# Patient Record
Sex: MALE | Race: WHITE | NOT HISPANIC OR LATINO | URBAN - METROPOLITAN AREA
[De-identification: names, ages, dates, MRNs, and addresses within clinical notes are randomized per-mention and may not be internally consistent; named-entity substitution may affect disease eponyms.]

---

## 2020-10-12 ENCOUNTER — OFFICE VISIT (OUTPATIENT)
Dept: URBAN - METROPOLITAN AREA CLINIC 84 | Facility: CLINIC | Age: 35
End: 2020-10-12
Payer: COMMERCIAL

## 2020-10-12 ENCOUNTER — DASHBOARD ENCOUNTERS (OUTPATIENT)
Age: 35
End: 2020-10-12

## 2020-10-12 ENCOUNTER — WEB ENCOUNTER (OUTPATIENT)
Dept: URBAN - METROPOLITAN AREA CLINIC 84 | Facility: CLINIC | Age: 35
End: 2020-10-12

## 2020-10-12 DIAGNOSIS — K90.49 FOOD INTOLERANCE IN ADULT: ICD-10-CM

## 2020-10-12 DIAGNOSIS — K63.5 COLON POLYPS: ICD-10-CM

## 2020-10-12 DIAGNOSIS — K92.1 RECTAL BLEEDING: ICD-10-CM

## 2020-10-12 DIAGNOSIS — Z80.0 FAMILY HISTORY OF COLON CANCER: ICD-10-CM

## 2020-10-12 PROBLEM — 235719002 INTOLERANCE TO FOOD (FINDING): Status: ACTIVE | Noted: 2020-10-12

## 2020-10-12 PROCEDURE — G8427 DOCREV CUR MEDS BY ELIG CLIN: HCPCS | Performed by: INTERNAL MEDICINE

## 2020-10-12 PROCEDURE — G8417 CALC BMI ABV UP PARAM F/U: HCPCS | Performed by: INTERNAL MEDICINE

## 2020-10-12 PROCEDURE — 99204 OFFICE O/P NEW MOD 45 MIN: CPT | Performed by: INTERNAL MEDICINE

## 2020-10-12 PROCEDURE — 1036F TOBACCO NON-USER: CPT | Performed by: INTERNAL MEDICINE

## 2020-10-12 RX ORDER — POLYETHYLENE GLYCOL 3350 17 G/17G
AS DIRECTED POWDER, FOR SOLUTION ORAL ONCE A DAY
Qty: 238 GRAM | Refills: 0 | OUTPATIENT
Start: 2020-10-12

## 2020-10-12 RX ORDER — DICYCLOMINE HYDROCHLORIDE 20 MG/1
TAKE 1 TABLET (20 MG) BY ORAL ROUTE 3 TIMES PER DAY FOR 30 DAYS TABLET ORAL
Qty: 90 TABLET | Refills: 5 | OUTPATIENT
Start: 2020-10-12

## 2020-10-12 NOTE — HPI-TODAY'S VISIT:
Patient present for evaluation of hx sis colon polys removed 5 years ago. Patient with significant fmhx of CRC in 1st and 2nd degree relatives. He does have intermittent painless rectal bleeding with wiping. Patient has daily soft BM. Patient admits to soft pasty stools.

## 2020-10-30 ENCOUNTER — OFFICE VISIT (OUTPATIENT)
Dept: URBAN - METROPOLITAN AREA SURGERY CENTER 20 | Facility: SURGERY CENTER | Age: 35
End: 2020-10-30
Payer: COMMERCIAL

## 2020-10-30 DIAGNOSIS — K92.1 BLACK STOOL: ICD-10-CM

## 2020-10-30 PROCEDURE — G9937 DIG OR SURV COLSCO: HCPCS | Performed by: INTERNAL MEDICINE

## 2020-10-30 PROCEDURE — 45378 DIAGNOSTIC COLONOSCOPY: CPT | Performed by: INTERNAL MEDICINE

## 2020-10-30 PROCEDURE — G8907 PT DOC NO EVENTS ON DISCHARG: HCPCS | Performed by: INTERNAL MEDICINE

## 2021-07-27 ENCOUNTER — HOSPITAL ENCOUNTER (EMERGENCY)
Dept: HOSPITAL 5 - ED | Age: 36
LOS: 1 days | Discharge: PSYCHIATRIC OR SUBSTANCE USE FACILITY | End: 2021-07-28
Payer: COMMERCIAL

## 2021-07-27 DIAGNOSIS — F32.9: Primary | ICD-10-CM

## 2021-07-27 DIAGNOSIS — R45.851: ICD-10-CM

## 2021-07-27 DIAGNOSIS — Z20.822: ICD-10-CM

## 2021-07-27 LAB
BASOPHILS # (AUTO): 0.1 K/MM3 (ref 0–0.1)
BASOPHILS NFR BLD AUTO: 0.7 % (ref 0–1.8)
BENZODIAZEPINES SCREEN,URINE: (no result)
BILIRUB UR QL STRIP: (no result)
BLOOD UR QL VISUAL: (no result)
BUN SERPL-MCNC: 8 MG/DL (ref 9–20)
BUN/CREAT SERPL: 9 %
CALCIUM SERPL-MCNC: 9.5 MG/DL (ref 8.4–10.2)
EOSINOPHIL # BLD AUTO: 0 K/MM3 (ref 0–0.4)
EOSINOPHIL NFR BLD AUTO: 0.6 % (ref 0–4.3)
HCT VFR BLD CALC: 44.9 % (ref 35.5–45.6)
HEMOLYSIS INDEX: 8
HGB BLD-MCNC: 15.5 GM/DL (ref 11.8–15.2)
LYMPHOCYTES # BLD AUTO: 2.1 K/MM3 (ref 1.2–5.4)
LYMPHOCYTES NFR BLD AUTO: 24.9 % (ref 13.4–35)
MCHC RBC AUTO-ENTMCNC: 35 % (ref 32–34)
MCV RBC AUTO: 90 FL (ref 84–94)
METHADONE SCREEN,URINE: (no result)
MONOCYTES # (AUTO): 0.4 K/MM3 (ref 0–0.8)
MONOCYTES % (AUTO): 5 % (ref 0–7.3)
MUCOUS THREADS #/AREA URNS HPF: (no result) /HPF
OPIATE SCREEN,URINE: (no result)
PH UR STRIP: 6 [PH] (ref 5–7)
PLATELET # BLD: 304 K/MM3 (ref 140–440)
RBC # BLD AUTO: 5.02 M/MM3 (ref 3.65–5.03)
RBC #/AREA URNS HPF: 2 /HPF (ref 0–6)
UROBILINOGEN UR-MCNC: 2 MG/DL (ref ?–2)
WBC #/AREA URNS HPF: 5 /HPF (ref 0–6)

## 2021-07-27 PROCEDURE — G0480 DRUG TEST DEF 1-7 CLASSES: HCPCS

## 2021-07-27 PROCEDURE — 80185 ASSAY OF PHENYTOIN TOTAL: CPT

## 2021-07-27 PROCEDURE — 99285 EMERGENCY DEPT VISIT HI MDM: CPT

## 2021-07-27 PROCEDURE — 36415 COLL VENOUS BLD VENIPUNCTURE: CPT

## 2021-07-27 PROCEDURE — 81001 URINALYSIS AUTO W/SCOPE: CPT

## 2021-07-27 PROCEDURE — 80048 BASIC METABOLIC PNL TOTAL CA: CPT

## 2021-07-27 PROCEDURE — 80307 DRUG TEST PRSMV CHEM ANLYZR: CPT

## 2021-07-27 PROCEDURE — 80320 DRUG SCREEN QUANTALCOHOLS: CPT

## 2021-07-27 PROCEDURE — U0003 INFECTIOUS AGENT DETECTION BY NUCLEIC ACID (DNA OR RNA); SEVERE ACUTE RESPIRATORY SYNDROME CORONAVIRUS 2 (SARS-COV-2) (CORONAVIRUS DISEASE [COVID-19]), AMPLIFIED PROBE TECHNIQUE, MAKING USE OF HIGH THROUGHPUT TECHNOLOGIES AS DESCRIBED BY CMS-2020-01-R: HCPCS

## 2021-07-27 PROCEDURE — 85025 COMPLETE CBC W/AUTO DIFF WBC: CPT

## 2021-07-27 PROCEDURE — 80164 ASSAY DIPROPYLACETIC ACD TOT: CPT

## 2021-07-27 NOTE — EMERGENCY DEPARTMENT REPORT
ED Psych HPI





- General


Chief Complaint: Sickle Cell Crisis


Stated Complaint: SI


Time Seen by Provider: 07/27/21 20:47


Source: patient


Mode of arrival: Ambulatory





- History of Present Illness


Initial Comments: 





Patient is 36 years old male with history of depression.  Patient presented to 

the ER today by himself stating that he is very depressed and he has been having

suicidal thoughts for the last few days.  Patient stated that his plan is to 

shoot himself.  Patient admitted that he does have a gun in his car.  Patient 

stated that he wrote a suicidal letter yesterday.  Patient denied any visual or 

auditory hallucination.  No homicidal ideation.  Patient immediately put on 

1013.  Charge nurse called patient wife about the gun.


MD Complaint: suicidal ideation, feels depressed


-: days(s)


Associated Psychiatric Symptoms: depression, suicidal ideation


History of same: Yes


Quality: constant


Associated Symptoms: denies other symptoms


If Self Harm: admits thoughts of, has plan, gunshot





- Related Data


                                    Allergies











Allergy/AdvReac Type Severity Reaction Status Date / Time


 


No Known Allergies Allergy   Unverified 07/27/21 20:13














ED Review of Systems


ROS: 


Stated complaint: SI


Other details as noted in HPI





Comment: All other systems reviewed and negative


Constitutional: denies: chills, fever


Respiratory: denies: cough, shortness of breath, SOB with exertion


Cardiovascular: denies: chest pain, palpitations


Gastrointestinal: denies: abdominal pain, nausea, vomiting


Musculoskeletal: denies: back pain


Neurological: denies: headache, weakness, numbness, paresthesias, confusion


Psychiatric: depression, suicidal thoughts.  denies: auditory hallucinations, 

visual hallucinations, homicidal thoughts





ED Physical Exam





- General


Limitations: No Limitations


General appearance: alert, in no apparent distress, other (depressed )





- Head


Head exam: Present: atraumatic, normocephalic, normal inspection





- Eye


Eye exam: Present: normal appearance, PERRL





- ENT


ENT exam: Present: normal exam, normal orophraynx, mucous membranes moist





- Neck


Neck exam: Present: normal inspection, full ROM.  Absent: tenderness, 

meningismus





- Respiratory


Respiratory exam: Present: normal lung sounds bilaterally





- Cardiovascular


Cardiovascular Exam: Present: regular rate, normal rhythm, normal heart sounds





- GI/Abdominal


GI/Abdominal exam: Present: soft, normal bowel sounds.  Absent: distended, 

tenderness, guarding, rebound, rigid, organomegaly, mass, bruit, pulsatile mass,

hernia





- Extremities Exam


Extremities exam: Present: normal inspection, full ROM, normal capillary refill.

 Absent: tenderness, pedal edema, joint swelling, calf tenderness





- Back Exam


Back exam: Present: normal inspection, full ROM.  Absent: CVA tenderness (R), 

CVA tenderness (L)





- Neurological Exam


Neurological exam: Present: alert, oriented X3, CN II-XII intact, normal gait, 

reflexes normal.  Absent: motor sensory deficit





- Psychiatric


Psychiatric exam: Present: depressed, suicidal ideation.  Absent: agitated, 

anxious, flat affect, manic, homicidal ideation





- Skin


Skin exam: Present: warm, intact, normal color





ED Course


                                   Vital Signs











  07/27/21 07/27/21 07/28/21





  20:11 23:08 01:54


 


Temperature 98.6 F  98.0 F


 


Pulse Rate 83  67


 


Respiratory 18  18





Rate   


 


Blood Pressure 133/83  


 


Blood Pressure   118/77





[Left]   


 


O2 Sat by Pulse 97 100 97





Oximetry   














  07/28/21 07/28/21





  07:59 10:32


 


Temperature 98.9 F 


 


Pulse Rate 74 


 


Respiratory 20 





Rate  


 


Blood Pressure  


 


Blood Pressure 125/79 





[Left]  


 


O2 Sat by Pulse 98 100





Oximetry  














ED Medical Decision Making





- Lab Data


Result diagrams: 


                                 07/27/21 20:40





                                 07/27/21 20:40


Critical care attestation.: 


If time is entered above; I have spent that time in minutes in the direct care 

of this critically ill patient, excluding procedure time.








ED Disposition


Clinical Impression: 


 Suicidal ideation





Disposition: [id number]/[id number] PS[facility] HOSP/PSY UNIT


Is pt being admited?: No


Condition: Stable


Referrals: 


PRIMARY CARE,MD [Primary Care Provider] - 3-5 Days

## 2021-07-27 NOTE — EVENT NOTE
ED Screening Note


Date of service: 07/27/21


Time: 20:13


ED Screening Note: 


Patient 36-year-old male with history of depression who presents for suicidal 

ideations with requesting medical clearance for psych evaluation.  Patient has 

no definitive plan at this time.  States SI thoughts intermittently.  There is 

no HI.  Patient denies other medical history.  





This initial assessment/diagnostic orders/clinical plan/treatment(s) is/are 

subject to change based on patients health status, clinical progression and re-

assessment by fellow clinical providers in the ED. Further treatment and workup 

at subsequent clinical providers discretion. Patient/guardian urged not to elope

from the ED as their condition may be serious if not clinically assessed and 

managed. 





Initial orders include:

## 2021-07-28 VITALS — SYSTOLIC BLOOD PRESSURE: 125 MMHG | DIASTOLIC BLOOD PRESSURE: 79 MMHG

## 2021-07-28 NOTE — CONSULTATION
History of Present Illness





- Reason for Consult


Consult date: 07/28/21


Reason for consult: Suicidal Ideation





- History of Present Psychiatric Illness


 ED Note: Patient is 36 years old male with history of depression.  Patient 

presented to the ER today by himself stating that he is very depressed and he 

has been having suicidal thoughts for the last few days.  Patient stated that 

his plan is to shoot himself.  Patient admitted that he does have a gun in his 

car.  Patient stated that he wrote a suicidal letter yesterday.  Patient denied 

any visual or auditory hallucination.  No homicidal ideation.  Patient 

immediately put on 1013.  Charge nurse called patient wife about the gun.





Kevin Rose is a 36 year old male with a history of depression who 

presents to the ED for increasing depression. In my interview with the patient ,

he states that he has been feeling depressed for a while stating " I don't know 

what my purpose is." The patient states his has been having relation issues with

his wife, and they have both attending family counseling sessions.  He states 

recent stressor as his wife wanting a divorce. The patient states he is not on 

psychotropic medication.  He denies any current suicidal/ homicidal ideation and

denies hallucinations.





PAST PSYCHIATRIC HISTORY


Diagnoses: Denies


Suicide attempts or Self-harm behavior: Denies


Prior psychiatric hospitalizations: Denies


Substance Abuse history: Denies


Previous psychiatric medications tried: Denies


Outpatient treatment: Unknown





SOCIAL HISTORY


Marital Status: 


Living Arrangements: Lives family


Employment Status: employed- 


Access to guns/weapons: Denied


Education: some college


History of Abuse: Denied


Legal History: None reported





REVIEW OF SYSTEMS


Constitutional: Negative for weight loss


ENT: Negative for stridor


Respiratory: Negative for cough or hemoptysis


All other systems reviewed and are negative





MENTAL STATUS EXAMINATION


General Appearance and Behavior: Age appropriate, dressed appropriately, calm 

and cooperative


Cooperation: Participating


Psychomotor Behavior: psychomotor normal


Mood: calm


Affect and affective range: Congruent with stated mood


Thought Process: Goal directed


Thought Content: Not SI


Speech: Normal volume, Regular rate and rhythm,


Intellectual Functioning: Average


Suicidal Ideation: Denied


Homicidal Ideation: Denied


Hallucinations: Denied


Delusions: None elicited


Impulse Control: Unimpaired


Insight and Judgment: Limited insight and judgment,


Memory: Normal


Attention: Undivided


Orientation: Alert, oriented





 Assessment and Plan 


(1) Major depressive disorder


(2) 





Treatment plan





Risks, benefits and alternatives of medications discussed with the patient, 

questions answered and consent obtained from patient.


PSYCHOTHERAPY: Supportive psychotherapy provided


MEDICAL: Per primary team


DELIRIUM PRECAUTIONS: Please re-orient patient frequently, keep lights on during

the day, and minimize benzodiazepines and opiates as these medications could 

worsen patient's confusion.


SAFETY SITTER: Per medical team


DISPOSITION:  Recommend acute inpatient psychiatric hospitalization.


FOLLOW-UP: Will Follow.


Thank you for the consult.  Please contact with any questions and/or concerns.


Case staffed with Dr. Lynne











 Medications and Allergies 


                                    Allergies








Medications and Allergies


                                    Allergies











Allergy/AdvReac Type Severity Reaction Status Date / Time


 


No Known Allergies Allergy   Unverified 07/27/21 20:13














Mental Status Exam





- Vital signs


                                Last Vital Signs











Temp  98.9 F   07/28/21 07:59


 


Pulse  74   07/28/21 07:59


 


Resp  20   07/28/21 07:59


 


BP  125/79   07/28/21 07:59


 


Pulse Ox  100   07/28/21 10:32














Results


Result Diagrams: 


                                 07/27/21 20:40





                                 07/27/21 20:40


                              Abnormal lab results











  07/27/21 07/27/21 07/27/21 Range/Units





  20:40 20:40 20:40 


 


Hgb  15.5 H    (11.8-15.2)  gm/dl


 


MCHC  35 H    (32-34)  %


 


BUN   8 L   (9-20)  mg/dL


 


Salicylates    < 0.3 L  (2.8-20.0)  mg/dL


 


Acetaminophen     (10.0-30.0)  ug/mL


 


Phenytoin    0.8 L  (10.0-20.0)  ug/mL


 


Valproic Acid    < 2.8 L  ()  ug/mL














  07/27/21 Range/Units





  20:40 


 


Hgb   (11.8-15.2)  gm/dl


 


MCHC   (32-34)  %


 


BUN   (9-20)  mg/dL


 


Salicylates   (2.8-20.0)  mg/dL


 


Acetaminophen  5.0 L  (10.0-30.0)  ug/mL


 


Phenytoin   (10.0-20.0)  ug/mL


 


Valproic Acid   ()  ug/mL








All other labs normal.

## 2021-08-28 ENCOUNTER — TELEPHONE ENCOUNTER (OUTPATIENT)
Dept: URBAN - METROPOLITAN AREA CLINIC 13 | Facility: CLINIC | Age: 36
End: 2021-08-28

## 2021-08-29 ENCOUNTER — TELEPHONE ENCOUNTER (OUTPATIENT)
Dept: URBAN - METROPOLITAN AREA CLINIC 13 | Facility: CLINIC | Age: 36
End: 2021-08-29